# Patient Record
Sex: MALE | URBAN - METROPOLITAN AREA
[De-identification: names, ages, dates, MRNs, and addresses within clinical notes are randomized per-mention and may not be internally consistent; named-entity substitution may affect disease eponyms.]

---

## 2019-04-03 ENCOUNTER — APPOINTMENT (RX ONLY)
Dept: URBAN - METROPOLITAN AREA CLINIC 74 | Facility: CLINIC | Age: 29
Setting detail: DERMATOLOGY
End: 2019-04-03

## 2019-04-03 DIAGNOSIS — L42 PITYRIASIS ROSEA: ICD-10-CM

## 2019-04-03 PROCEDURE — ? COUNSELING

## 2019-04-03 PROCEDURE — ? TREATMENT REGIMEN

## 2019-04-03 PROCEDURE — 99203 OFFICE O/P NEW LOW 30 MIN: CPT

## 2019-04-03 ASSESSMENT — LOCATION SIMPLE DESCRIPTION DERM
LOCATION SIMPLE: LEFT UPPER BACK
LOCATION SIMPLE: LEFT UPPER ARM
LOCATION SIMPLE: RIGHT UPPER ARM
LOCATION SIMPLE: RIGHT UPPER BACK

## 2019-04-03 ASSESSMENT — LOCATION DETAILED DESCRIPTION DERM
LOCATION DETAILED: RIGHT PROXIMAL POSTERIOR UPPER ARM
LOCATION DETAILED: LEFT PROXIMAL POSTERIOR UPPER ARM
LOCATION DETAILED: LEFT SUPERIOR UPPER BACK
LOCATION DETAILED: LEFT INFERIOR UPPER BACK
LOCATION DETAILED: RIGHT SUPERIOR LATERAL UPPER BACK

## 2019-04-03 ASSESSMENT — SEVERITY ASSESSMENT: SEVERITY: MILD TO MODERATE

## 2019-04-03 ASSESSMENT — LOCATION ZONE DERM
LOCATION ZONE: TRUNK
LOCATION ZONE: ARM

## 2019-04-03 NOTE — PROCEDURE: TREATMENT REGIMEN
Detail Level: Zone
Plan: - Pt is here for rash on arms and trunk today. \\n- Pt reports that the spot on left shoulder was the first spot to appear\\n- the rest of the rash appeared within two weeks\\n- it has been present since Jan 2019, pt has had it previously in Jan 2018 which pt exports that it took 4-5 months to go away\\n- most c/w Pityriasis Rosacea. \\n- reviewed option for topical steroid\\n- consider repeating biopsy, discussed that we would try to get biopsy from previous dermatology. Pt opted to wait until we get results from biopsy at other dermatologist until doing another biopsy. (Pt believes dermatology was [facility]). \\n- pt declined oral antibiotics.\\n- discussed that sun exposure could help\\n- reviewed it could take 2-4 months to clear\\n- Pt reports getting very sick 2 years ago before pt got first rash in Jan 2018, pt reports getting a 24 hour bug a few months ago\\n- pictures taken today\\n- release of records signed today, see attachments\\nWill wait for biopsy results to let pt know when to RTC

## 2019-04-03 NOTE — HPI: RASH
How Severe Is Your Rash?: moderate
Is This A New Presentation, Or A Follow-Up?: Rash
Additional History: Pt reports that Jan 2018 pt had similar rash and went to 2 different dermatologists and one told him possibly eczema, the other said possibly psoriasis. Pt said that two biopsies were performed, one a scratch test and the other was a punch biopsy. Pt was unclear the results, he just remembered that the results denied ringworm and psoriasis. Pt reports that the rash went away on its own and came back Jan 2019. Pt said that rash is only on back and back of arms. Picture taken today. Pt reports that the rash is sometimes itchy. Pt reports that he has used Triamcinolone 0.1% cream in the past and it didn’t help. Pt denies taking any antihistamines. Pt has not been using any medication on his back this time getting the rash.

## 2019-04-03 NOTE — PROCEDURE: MIPS QUALITY
Quality 130: Documentation Of Current Medications In The Medical Record: Current Medications Documented
Quality 131: Pain Assessment And Follow-Up: No documentation of pain assessment, reason not given
Detail Level: Detailed
Quality 110: Preventive Care And Screening: Influenza Immunization: Influenza Immunization Administered during Influenza season